# Patient Record
Sex: FEMALE | Race: AMERICAN INDIAN OR ALASKA NATIVE
[De-identification: names, ages, dates, MRNs, and addresses within clinical notes are randomized per-mention and may not be internally consistent; named-entity substitution may affect disease eponyms.]

---

## 2019-03-21 ENCOUNTER — HOSPITAL ENCOUNTER (OUTPATIENT)
Dept: HOSPITAL 5 - PET | Age: 64
Discharge: HOME | End: 2019-03-21
Attending: INTERNAL MEDICINE
Payer: MEDICARE

## 2019-03-21 DIAGNOSIS — R73.09: ICD-10-CM

## 2019-03-21 DIAGNOSIS — C34.11: Primary | ICD-10-CM

## 2019-03-21 PROCEDURE — 78815 PET IMAGE W/CT SKULL-THIGH: CPT

## 2019-03-21 PROCEDURE — A9552 F18 FDG: HCPCS

## 2019-03-21 PROCEDURE — 82962 GLUCOSE BLOOD TEST: CPT

## 2019-03-21 NOTE — PET REPORT
PET SB TO [facility] INITIAL:



HISTORY: 1.7 cm lung mass.



TECHNIQUE: 12.2 millicuries F-18 FDG was administered intravenously.  

Noncontrast CT images and PET images were obtained from the skull base 

to the proximal thighs.  Fused images were reviewed on a workstation.  

The patient's blood glucose level measured 186.



COMPARISON: None at this facility.



COMMENT: The PET images are slightly limited with increased soft tissue 

activity secondary to a blood glucose level of 186.





FINDINGS:



BRAIN: physiologic FDG uptake in the imaged brain.  



NECK: physiologic FDG uptake.



MEDIASTINUM: physiologic FDG uptake. Borderline to mild cardiomegaly is 

noted.



LUNGS: physiologic FDG uptake. A 1.8 cm partially cavitating lesion is 

identified in the subpleural right upper lobe. Max SUV of this lesion 

measures 1.6. No additional lung lesion or hypermetabolic mass.



PLEURA/PERICARDIUM: physiologic FDG uptake. Small layering pleural 

effusions are noted bilaterally.



THORACIC LYMPH NODES: physiologic FDG uptake.



HEPATOBILIARY: physiologic FDG uptake.  Mean liver SUV measures 3.3.



PANCREAS: physiologic FDG uptake. The pancreas is atrophic with 

calcifications suggesting chronic pancreatitis



SPLEEN: physiologic FDG uptake.



ADRENAL GLANDS: physiologic FDG uptake.



KIDNEYS/RENAL COLLECTING SYSTEMS: physiologic FDG uptake.



BOWEL/MESENTERY: physiologic FDG uptake.



PELVIC VISCERA: physiologic FDG uptake. 2 cm calcified uterine fibroid 

at the uterine fundus is noted.



ABDOMINAL/PELVIC LYMPH NODES: physiologic FDG uptake.



MUSCULOSKELETAL: physiologic FDG uptake.







IMPRESSION:

Negative PET/CT. A 1.8 cm partially cystic lesion is noted in the right 

upper lobe. This lesion is hypometabolic with Max SUV measuring 1.6. No 

suspicious areas of hypermetabolic activity are identified. Consider 

surveillance with CT chest with contrast.